# Patient Record
Sex: FEMALE | Race: WHITE | NOT HISPANIC OR LATINO | Employment: OTHER | URBAN - METROPOLITAN AREA
[De-identification: names, ages, dates, MRNs, and addresses within clinical notes are randomized per-mention and may not be internally consistent; named-entity substitution may affect disease eponyms.]

---

## 2023-06-18 ENCOUNTER — OFFICE VISIT (OUTPATIENT)
Dept: URGENT CARE | Facility: URGENT CARE | Age: 73
End: 2023-06-18
Payer: MEDICARE

## 2023-06-18 VITALS
RESPIRATION RATE: 20 BRPM | TEMPERATURE: 98.2 F | SYSTOLIC BLOOD PRESSURE: 143 MMHG | WEIGHT: 265 LBS | HEART RATE: 101 BPM | OXYGEN SATURATION: 98 % | DIASTOLIC BLOOD PRESSURE: 80 MMHG

## 2023-06-18 DIAGNOSIS — H57.89 REDNESS AND DISCHARGE OF EYE: Primary | ICD-10-CM

## 2023-06-18 PROBLEM — R91.1 PULMONARY NODULE: Status: ACTIVE | Noted: 2021-04-13

## 2023-06-18 PROBLEM — E66.9 OBESITY: Status: ACTIVE | Noted: 2019-10-21

## 2023-06-18 PROBLEM — M17.12 OSTEOARTHRITIS OF LEFT KNEE: Status: ACTIVE | Noted: 2018-06-18

## 2023-06-18 PROBLEM — Z91.09 POLLEN ALLERGIES: Status: ACTIVE | Noted: 2017-10-11

## 2023-06-18 PROBLEM — M81.0 AGE-RELATED OSTEOPOROSIS WITHOUT CURRENT PATHOLOGICAL FRACTURE: Status: ACTIVE | Noted: 2021-10-21

## 2023-06-18 PROBLEM — F51.04 PSYCHOPHYSIOLOGICAL INSOMNIA: Status: ACTIVE | Noted: 2019-05-05

## 2023-06-18 PROBLEM — E78.5 HYPERLIPIDEMIA: Status: ACTIVE | Noted: 2021-06-17

## 2023-06-18 PROBLEM — G25.81 RESTLESS LEGS: Status: ACTIVE | Noted: 2018-11-29

## 2023-06-18 PROBLEM — F41.9 ANXIETY: Status: ACTIVE | Noted: 2019-05-05

## 2023-06-18 PROBLEM — K85.00 IDIOPATHIC ACUTE PANCREATITIS WITHOUT INFECTION OR NECROSIS: Status: ACTIVE | Noted: 2019-04-30

## 2023-06-18 PROBLEM — E03.9 HYPOTHYROIDISM: Status: ACTIVE | Noted: 2021-10-21

## 2023-06-18 PROBLEM — I10 ESSENTIAL HYPERTENSION: Status: ACTIVE | Noted: 2019-05-05

## 2023-06-18 PROCEDURE — 99203 OFFICE O/P NEW LOW 30 MIN: CPT | Performed by: NURSE PRACTITIONER

## 2023-06-18 RX ORDER — ALENDRONATE SODIUM 70 MG/1
70 TABLET ORAL
COMMUNITY
Start: 2023-05-23

## 2023-06-18 RX ORDER — MAGNESIUM OXIDE 400 MG/1
400 TABLET ORAL DAILY
Status: ON HOLD | COMMUNITY
End: 2024-09-06

## 2023-06-18 RX ORDER — SPIRONOLACTONE 25 MG/1
1 TABLET ORAL DAILY
Status: ON HOLD | COMMUNITY
Start: 2022-09-09 | End: 2024-09-06

## 2023-06-18 RX ORDER — EZETIMIBE 10 MG/1
10 TABLET ORAL DAILY
COMMUNITY
Start: 2023-04-30

## 2023-06-18 RX ORDER — LEVOTHYROXINE SODIUM 150 UG/1
150 TABLET ORAL DAILY
COMMUNITY
Start: 2023-04-30

## 2023-06-18 RX ORDER — FUROSEMIDE 20 MG
20 TABLET ORAL DAILY
COMMUNITY
Start: 2023-06-13

## 2023-06-18 RX ORDER — GINSENG 100 MG
50 CAPSULE ORAL DAILY
Status: ON HOLD | COMMUNITY
Start: 2021-10-01 | End: 2024-09-06

## 2023-06-18 RX ORDER — LISINOPRIL 5 MG/1
5 TABLET ORAL DAILY
COMMUNITY
Start: 2023-04-30

## 2023-06-18 RX ORDER — DIPHENOXYLATE HYDROCHLORIDE AND ATROPINE SULFATE 2.5; .025 MG/1; MG/1
1 TABLET ORAL DAILY
Status: ON HOLD | COMMUNITY
End: 2024-09-06

## 2023-06-18 NOTE — PATIENT INSTRUCTIONS
Follow up with ophthalmology tomorrow    Go to the Emergency Room if vision changes or pain increases immediately

## 2023-06-18 NOTE — NURSING NOTE
VISION   Wears glasses: worn for testing  Tool used: Grady   Right eye:        10/25 (20/50)  Left eye:          10/10 (20/20)    JAQUELINE Cruz, [facility] June 18, 2023 10:53 AM

## 2023-06-18 NOTE — PROGRESS NOTES
Chief Complaint   Patient presents with     Urgent Care     Right eye infection          ICD-10-CM    1. Redness and discharge of eye  H57.89 Adult Eye  Referral      Referral made for patient to be seen tomorrow by ophthalmology.  She is instructed to go to the emergency room if she starts to have any changes in her vision or pain in the eye itself.    Differential diagnosis includes but is not limited to: Corneal abrasion, lens injury, scleral abrasion, hyphema, globe perforation, traumatic iritis, retinal detachment, vitreous hemorrhage, eye wrist trauma, orbit fracture, uveitis, conjunctivitis, pterygium, foreign body, acute glaucoma.    Subjective     Lynda Knight is an 73 year old female who presents to clinic today for swelling around the right eye since 4 days ago,watery drainage.  She denies any trauma or injury.  She has had no changes in her vision.      ROS: 10 point ROS neg other than the symptoms noted above in the HPI.       Objective    BP (!) 143/80   Pulse 101   Temp 98.2  F (36.8  C)   Resp 20   Wt 120.2 kg (265 lb)   SpO2 98%   Nurses notes and VS have been reviewed.    Visual acuity was within normal limits bilaterally today.    Physical Exam       GENERAL APPEARANCE: healthy appearing, alert     EYES: Pupils are equal reactive to light, upper lid is slightly swollen, under the eye there is mild edema but no erythema, conjunctiva are severely injected on the right, entire sclera appears dark red and edematous     HENT: oral exam benign, mucus membranes intact, without ulcers or lesions     NECK: no adenopathy or asymmetry, thyroid normal to palpation     SKIN: no suspicious lesions or rashes     NEURO: Normal strength and tone, mentation intact and speech normal      ADORE Tracy, CNP  [facility] Provider    The use of Dragon/[employer] dictation services may have been used to construct the content in this note; any grammatical or spelling errors are  non-intentional. Please contact the author of this note directly if you are in need of any clarification.

## 2024-08-26 ENCOUNTER — TRANSFERRED RECORDS (OUTPATIENT)
Dept: MULTI SPECIALTY CLINIC | Facility: CLINIC | Age: 74
End: 2024-08-26
Payer: MEDICARE

## 2024-08-26 LAB
CREATININE (EXTERNAL): 0.74 MG/DL (ref 0.55–1.02)
GFR ESTIMATED (EXTERNAL): >60 ML/MIN/1.73M2
HBA1C MFR BLD: 5.8 %
POTASSIUM (EXTERNAL): 4.2 MMOL/L (ref 3.5–5.1)

## 2024-09-03 RX ORDER — OMEGA-3 FATTY ACIDS/FISH OIL 300-1000MG
200 CAPSULE ORAL EVERY 6 HOURS PRN
Status: ON HOLD | COMMUNITY
End: 2024-09-06

## 2024-09-05 ENCOUNTER — ANESTHESIA EVENT (OUTPATIENT)
Dept: SURGERY | Facility: CLINIC | Age: 74
End: 2024-09-05
Payer: MEDICARE

## 2024-09-06 ENCOUNTER — ANESTHESIA (OUTPATIENT)
Dept: SURGERY | Facility: CLINIC | Age: 74
End: 2024-09-06
Payer: MEDICARE

## 2024-09-06 ENCOUNTER — HOSPITAL ENCOUNTER (OUTPATIENT)
Facility: CLINIC | Age: 74
Setting detail: OBSERVATION
Discharge: HOME | End: 2024-09-07
Attending: ORTHOPAEDIC SURGERY | Admitting: ORTHOPAEDIC SURGERY
Payer: MEDICARE

## 2024-09-06 ENCOUNTER — APPOINTMENT (OUTPATIENT)
Dept: RADIOLOGY | Facility: CLINIC | Age: 74
End: 2024-09-06
Attending: ORTHOPAEDIC SURGERY
Payer: MEDICARE

## 2024-09-06 ENCOUNTER — ANCILLARY PROCEDURE (OUTPATIENT)
Dept: ULTRASOUND IMAGING | Facility: CLINIC | Age: 74
End: 2024-09-06
Attending: ANESTHESIOLOGY
Payer: MEDICARE

## 2024-09-06 DIAGNOSIS — Z98.1 S/P ANKLE FUSION: Primary | ICD-10-CM

## 2024-09-06 PROCEDURE — 250N000011 HC RX IP 250 OP 636: Performed by: ANESTHESIOLOGY

## 2024-09-06 PROCEDURE — 258N000003 HC RX IP 258 OP 636: Performed by: ANESTHESIOLOGY

## 2024-09-06 PROCEDURE — 999N000141 HC STATISTIC PRE-PROCEDURE NURSING ASSESSMENT: Performed by: ORTHOPAEDIC SURGERY

## 2024-09-06 PROCEDURE — 710N000010 HC RECOVERY PHASE 1, LEVEL 2, PER MIN: Performed by: ORTHOPAEDIC SURGERY

## 2024-09-06 PROCEDURE — 258N000003 HC RX IP 258 OP 636: Performed by: NURSE ANESTHETIST, CERTIFIED REGISTERED

## 2024-09-06 PROCEDURE — 272N000001 HC OR GENERAL SUPPLY STERILE: Performed by: ORTHOPAEDIC SURGERY

## 2024-09-06 PROCEDURE — 250N000025 HC SEVOFLURANE, PER MIN: Performed by: ORTHOPAEDIC SURGERY

## 2024-09-06 PROCEDURE — 250N000011 HC RX IP 250 OP 636: Performed by: PHYSICIAN ASSISTANT

## 2024-09-06 PROCEDURE — 250N000011 HC RX IP 250 OP 636: Performed by: NURSE ANESTHETIST, CERTIFIED REGISTERED

## 2024-09-06 PROCEDURE — 250N000013 HC RX MED GY IP 250 OP 250 PS 637: Performed by: ANESTHESIOLOGY

## 2024-09-06 PROCEDURE — 258N000003 HC RX IP 258 OP 636: Performed by: PHYSICIAN ASSISTANT

## 2024-09-06 PROCEDURE — 370N000017 HC ANESTHESIA TECHNICAL FEE, PER MIN: Performed by: ORTHOPAEDIC SURGERY

## 2024-09-06 PROCEDURE — C1713 ANCHOR/SCREW BN/BN,TIS/BN: HCPCS | Performed by: ORTHOPAEDIC SURGERY

## 2024-09-06 PROCEDURE — 250N000013 HC RX MED GY IP 250 OP 250 PS 637: Performed by: PHYSICIAN ASSISTANT

## 2024-09-06 PROCEDURE — 360N000083 HC SURGERY LEVEL 3 W/ FLUORO, PER MIN: Performed by: ORTHOPAEDIC SURGERY

## 2024-09-06 PROCEDURE — 250N000009 HC RX 250: Performed by: NURSE ANESTHETIST, CERTIFIED REGISTERED

## 2024-09-06 PROCEDURE — 710N000012 HC RECOVERY PHASE 2, PER MINUTE: Performed by: ORTHOPAEDIC SURGERY

## 2024-09-06 PROCEDURE — 999N000180 XR SURGERY CARM FLUORO LESS THAN 5 MIN: Mod: TC

## 2024-09-06 PROCEDURE — C1734 ORTH/DEVIC/DRUG BN/BN,TIS/BN: HCPCS | Performed by: ORTHOPAEDIC SURGERY

## 2024-09-06 DEVICE — IMPLANTABLE DEVICE: Type: IMPLANTABLE DEVICE | Site: ANKLE | Status: FUNCTIONAL

## 2024-09-06 DEVICE — GRAFT BONE SUBSTITUTE BIOCOMP AUGMENT INJ 3ML K300-030-10: Type: IMPLANTABLE DEVICE | Site: ANKLE | Status: FUNCTIONAL

## 2024-09-06 RX ORDER — BUPIVACAINE HYDROCHLORIDE 5 MG/ML
INJECTION, SOLUTION EPIDURAL; INTRACAUDAL
Status: COMPLETED | OUTPATIENT
Start: 2024-09-06 | End: 2024-09-06

## 2024-09-06 RX ORDER — HYDROXYZINE HYDROCHLORIDE 10 MG/1
10 TABLET, FILM COATED ORAL EVERY 6 HOURS PRN
Status: DISCONTINUED | OUTPATIENT
Start: 2024-09-06 | End: 2024-09-07 | Stop reason: HOSPADM

## 2024-09-06 RX ORDER — LEVOTHYROXINE SODIUM 75 UG/1
150 TABLET ORAL DAILY
Status: DISCONTINUED | OUTPATIENT
Start: 2024-09-07 | End: 2024-09-07 | Stop reason: HOSPADM

## 2024-09-06 RX ORDER — NALOXONE HYDROCHLORIDE 0.4 MG/ML
0.1 INJECTION, SOLUTION INTRAMUSCULAR; INTRAVENOUS; SUBCUTANEOUS
Status: DISCONTINUED | OUTPATIENT
Start: 2024-09-06 | End: 2024-09-06 | Stop reason: HOSPADM

## 2024-09-06 RX ORDER — FUROSEMIDE 20 MG
20 TABLET ORAL DAILY
Status: DISCONTINUED | OUTPATIENT
Start: 2024-09-06 | End: 2024-09-07 | Stop reason: HOSPADM

## 2024-09-06 RX ORDER — CELECOXIB 200 MG/1
200 CAPSULE ORAL DAILY
Qty: 14 CAPSULE | Refills: 0 | Status: SHIPPED | OUTPATIENT
Start: 2024-09-06 | End: 2024-09-20

## 2024-09-06 RX ORDER — LISINOPRIL 5 MG/1
5 TABLET ORAL DAILY
Status: DISCONTINUED | OUTPATIENT
Start: 2024-09-07 | End: 2024-09-07 | Stop reason: HOSPADM

## 2024-09-06 RX ORDER — FENTANYL CITRATE 50 UG/ML
25 INJECTION, SOLUTION INTRAMUSCULAR; INTRAVENOUS
Status: DISCONTINUED | OUTPATIENT
Start: 2024-09-06 | End: 2024-09-06 | Stop reason: HOSPADM

## 2024-09-06 RX ORDER — HYDROMORPHONE HCL IN WATER/PF 6 MG/30 ML
0.2 PATIENT CONTROLLED ANALGESIA SYRINGE INTRAVENOUS EVERY 5 MIN PRN
Status: DISCONTINUED | OUTPATIENT
Start: 2024-09-06 | End: 2024-09-06 | Stop reason: HOSPADM

## 2024-09-06 RX ORDER — BISACODYL 10 MG
10 SUPPOSITORY, RECTAL RECTAL DAILY PRN
Status: DISCONTINUED | OUTPATIENT
Start: 2024-09-09 | End: 2024-09-07 | Stop reason: HOSPADM

## 2024-09-06 RX ORDER — FENTANYL CITRATE 50 UG/ML
INJECTION, SOLUTION INTRAMUSCULAR; INTRAVENOUS PRN
Status: DISCONTINUED | OUTPATIENT
Start: 2024-09-06 | End: 2024-09-06

## 2024-09-06 RX ORDER — ACETAMINOPHEN 325 MG/1
650 TABLET ORAL EVERY 6 HOURS
Qty: 100 TABLET | Refills: 0 | Status: SHIPPED | OUTPATIENT
Start: 2024-09-06

## 2024-09-06 RX ORDER — ONDANSETRON 2 MG/ML
INJECTION INTRAMUSCULAR; INTRAVENOUS PRN
Status: DISCONTINUED | OUTPATIENT
Start: 2024-09-06 | End: 2024-09-06

## 2024-09-06 RX ORDER — SODIUM CHLORIDE, SODIUM LACTATE, POTASSIUM CHLORIDE, CALCIUM CHLORIDE 600; 310; 30; 20 MG/100ML; MG/100ML; MG/100ML; MG/100ML
INJECTION, SOLUTION INTRAVENOUS CONTINUOUS
Status: DISCONTINUED | OUTPATIENT
Start: 2024-09-06 | End: 2024-09-06 | Stop reason: HOSPADM

## 2024-09-06 RX ORDER — AMOXICILLIN 250 MG
1 CAPSULE ORAL 2 TIMES DAILY
Status: DISCONTINUED | OUTPATIENT
Start: 2024-09-06 | End: 2024-09-07 | Stop reason: HOSPADM

## 2024-09-06 RX ORDER — NALOXONE HYDROCHLORIDE 0.4 MG/ML
0.4 INJECTION, SOLUTION INTRAMUSCULAR; INTRAVENOUS; SUBCUTANEOUS
Status: DISCONTINUED | OUTPATIENT
Start: 2024-09-06 | End: 2024-09-07 | Stop reason: HOSPADM

## 2024-09-06 RX ORDER — PROPOFOL 10 MG/ML
INJECTION, EMULSION INTRAVENOUS PRN
Status: DISCONTINUED | OUTPATIENT
Start: 2024-09-06 | End: 2024-09-06

## 2024-09-06 RX ORDER — PROCHLORPERAZINE MALEATE 5 MG
5 TABLET ORAL EVERY 6 HOURS PRN
Status: DISCONTINUED | OUTPATIENT
Start: 2024-09-06 | End: 2024-09-07 | Stop reason: HOSPADM

## 2024-09-06 RX ORDER — PROPOFOL 10 MG/ML
INJECTION, EMULSION INTRAVENOUS
Status: DISCONTINUED
Start: 2024-09-06 | End: 2024-09-06 | Stop reason: HOSPADM

## 2024-09-06 RX ORDER — DEXAMETHASONE SODIUM PHOSPHATE 4 MG/ML
4 INJECTION, SOLUTION INTRA-ARTICULAR; INTRALESIONAL; INTRAMUSCULAR; INTRAVENOUS; SOFT TISSUE
Status: DISCONTINUED | OUTPATIENT
Start: 2024-09-06 | End: 2024-09-06 | Stop reason: HOSPADM

## 2024-09-06 RX ORDER — OXYCODONE HYDROCHLORIDE 5 MG/1
10 TABLET ORAL EVERY 4 HOURS PRN
Status: DISCONTINUED | OUTPATIENT
Start: 2024-09-06 | End: 2024-09-07 | Stop reason: HOSPADM

## 2024-09-06 RX ORDER — ACETAMINOPHEN 325 MG/1
975 TABLET ORAL ONCE
Status: COMPLETED | OUTPATIENT
Start: 2024-09-06 | End: 2024-09-06

## 2024-09-06 RX ORDER — EZETIMIBE 10 MG/1
10 TABLET ORAL DAILY
Status: DISCONTINUED | OUTPATIENT
Start: 2024-09-06 | End: 2024-09-07 | Stop reason: HOSPADM

## 2024-09-06 RX ORDER — OXYCODONE HYDROCHLORIDE 5 MG/1
5 TABLET ORAL EVERY 4 HOURS PRN
Status: DISCONTINUED | OUTPATIENT
Start: 2024-09-06 | End: 2024-09-07 | Stop reason: HOSPADM

## 2024-09-06 RX ORDER — POLYETHYLENE GLYCOL 3350 17 G/17G
17 POWDER, FOR SOLUTION ORAL DAILY
Status: DISCONTINUED | OUTPATIENT
Start: 2024-09-07 | End: 2024-09-07 | Stop reason: HOSPADM

## 2024-09-06 RX ORDER — FENTANYL CITRATE 50 UG/ML
25-100 INJECTION, SOLUTION INTRAMUSCULAR; INTRAVENOUS
Status: DISCONTINUED | OUTPATIENT
Start: 2024-09-06 | End: 2024-09-06 | Stop reason: HOSPADM

## 2024-09-06 RX ORDER — ONDANSETRON 2 MG/ML
4 INJECTION INTRAMUSCULAR; INTRAVENOUS EVERY 6 HOURS PRN
Status: DISCONTINUED | OUTPATIENT
Start: 2024-09-06 | End: 2024-09-07 | Stop reason: HOSPADM

## 2024-09-06 RX ORDER — OXYCODONE HYDROCHLORIDE 5 MG/1
5-10 TABLET ORAL EVERY 4 HOURS PRN
Qty: 12 TABLET | Refills: 0 | Status: SHIPPED | OUTPATIENT
Start: 2024-09-06

## 2024-09-06 RX ORDER — POLYETHYLENE GLYCOL 3350 17 G/17G
1 POWDER, FOR SOLUTION ORAL DAILY
Qty: 7 PACKET | Refills: 0 | Status: SHIPPED | OUTPATIENT
Start: 2024-09-06

## 2024-09-06 RX ORDER — ASPIRIN 325 MG
325 TABLET, DELAYED RELEASE (ENTERIC COATED) ORAL DAILY
Status: DISCONTINUED | OUTPATIENT
Start: 2024-09-06 | End: 2024-09-07 | Stop reason: HOSPADM

## 2024-09-06 RX ORDER — SODIUM CHLORIDE, SODIUM LACTATE, POTASSIUM CHLORIDE, CALCIUM CHLORIDE 600; 310; 30; 20 MG/100ML; MG/100ML; MG/100ML; MG/100ML
INJECTION, SOLUTION INTRAVENOUS CONTINUOUS
Status: DISCONTINUED | OUTPATIENT
Start: 2024-09-06 | End: 2024-09-07 | Stop reason: HOSPADM

## 2024-09-06 RX ORDER — CELECOXIB 100 MG/1
100 CAPSULE ORAL 2 TIMES DAILY
Status: DISCONTINUED | OUTPATIENT
Start: 2024-09-07 | End: 2024-09-07 | Stop reason: HOSPADM

## 2024-09-06 RX ORDER — HYDROXYZINE HYDROCHLORIDE 10 MG/1
10 TABLET, FILM COATED ORAL EVERY 6 HOURS PRN
Qty: 30 TABLET | Refills: 0 | Status: SHIPPED | OUTPATIENT
Start: 2024-09-06

## 2024-09-06 RX ORDER — NALOXONE HYDROCHLORIDE 0.4 MG/ML
0.2 INJECTION, SOLUTION INTRAMUSCULAR; INTRAVENOUS; SUBCUTANEOUS
Status: DISCONTINUED | OUTPATIENT
Start: 2024-09-06 | End: 2024-09-07 | Stop reason: HOSPADM

## 2024-09-06 RX ORDER — HYDROMORPHONE HCL IN WATER/PF 6 MG/30 ML
0.4 PATIENT CONTROLLED ANALGESIA SYRINGE INTRAVENOUS
Status: DISCONTINUED | OUTPATIENT
Start: 2024-09-06 | End: 2024-09-07 | Stop reason: HOSPADM

## 2024-09-06 RX ORDER — SPIRONOLACTONE 25 MG/1
25 TABLET ORAL DAILY
COMMUNITY

## 2024-09-06 RX ORDER — CEFAZOLIN SODIUM 2 G/100ML
2 INJECTION, SOLUTION INTRAVENOUS EVERY 8 HOURS
Status: COMPLETED | OUTPATIENT
Start: 2024-09-06 | End: 2024-09-07

## 2024-09-06 RX ORDER — LIDOCAINE HYDROCHLORIDE 10 MG/ML
INJECTION, SOLUTION INFILTRATION; PERINEURAL PRN
Status: DISCONTINUED | OUTPATIENT
Start: 2024-09-06 | End: 2024-09-06

## 2024-09-06 RX ORDER — ASPIRIN 325 MG
325 TABLET, DELAYED RELEASE (ENTERIC COATED) ORAL DAILY
Qty: 42 TABLET | Refills: 0 | Status: SHIPPED | OUTPATIENT
Start: 2024-09-06

## 2024-09-06 RX ORDER — DEXAMETHASONE SODIUM PHOSPHATE 10 MG/ML
INJECTION, SOLUTION INTRAMUSCULAR; INTRAVENOUS PRN
Status: DISCONTINUED | OUTPATIENT
Start: 2024-09-06 | End: 2024-09-06

## 2024-09-06 RX ORDER — HYDROMORPHONE HCL IN WATER/PF 6 MG/30 ML
0.4 PATIENT CONTROLLED ANALGESIA SYRINGE INTRAVENOUS EVERY 5 MIN PRN
Status: DISCONTINUED | OUTPATIENT
Start: 2024-09-06 | End: 2024-09-06 | Stop reason: HOSPADM

## 2024-09-06 RX ORDER — FENTANYL CITRATE 50 UG/ML
50 INJECTION, SOLUTION INTRAMUSCULAR; INTRAVENOUS EVERY 5 MIN PRN
Status: DISCONTINUED | OUTPATIENT
Start: 2024-09-06 | End: 2024-09-06 | Stop reason: HOSPADM

## 2024-09-06 RX ORDER — ACETAMINOPHEN 325 MG/1
650 TABLET ORAL EVERY 6 HOURS
Status: DISCONTINUED | OUTPATIENT
Start: 2024-09-06 | End: 2024-09-07 | Stop reason: HOSPADM

## 2024-09-06 RX ORDER — LIDOCAINE 40 MG/G
CREAM TOPICAL
Status: DISCONTINUED | OUTPATIENT
Start: 2024-09-06 | End: 2024-09-07 | Stop reason: HOSPADM

## 2024-09-06 RX ORDER — AMOXICILLIN 250 MG
1-2 CAPSULE ORAL 2 TIMES DAILY
Qty: 30 TABLET | Refills: 0 | Status: SHIPPED | OUTPATIENT
Start: 2024-09-06

## 2024-09-06 RX ORDER — MEPERIDINE HYDROCHLORIDE 25 MG/ML
12.5 INJECTION INTRAMUSCULAR; INTRAVENOUS; SUBCUTANEOUS EVERY 5 MIN PRN
Status: DISCONTINUED | OUTPATIENT
Start: 2024-09-06 | End: 2024-09-06 | Stop reason: HOSPADM

## 2024-09-06 RX ORDER — OXYCODONE HYDROCHLORIDE 5 MG/1
5 TABLET ORAL
Status: COMPLETED | OUTPATIENT
Start: 2024-09-06 | End: 2024-09-06

## 2024-09-06 RX ORDER — FENTANYL CITRATE 50 UG/ML
25 INJECTION, SOLUTION INTRAMUSCULAR; INTRAVENOUS EVERY 5 MIN PRN
Status: DISCONTINUED | OUTPATIENT
Start: 2024-09-06 | End: 2024-09-06 | Stop reason: HOSPADM

## 2024-09-06 RX ORDER — ONDANSETRON 2 MG/ML
4 INJECTION INTRAMUSCULAR; INTRAVENOUS EVERY 30 MIN PRN
Status: DISCONTINUED | OUTPATIENT
Start: 2024-09-06 | End: 2024-09-06 | Stop reason: HOSPADM

## 2024-09-06 RX ORDER — CELECOXIB 100 MG/1
100 CAPSULE ORAL 2 TIMES DAILY
Status: DISCONTINUED | OUTPATIENT
Start: 2024-09-06 | End: 2024-09-06

## 2024-09-06 RX ORDER — LIDOCAINE 40 MG/G
CREAM TOPICAL
Status: DISCONTINUED | OUTPATIENT
Start: 2024-09-06 | End: 2024-09-06 | Stop reason: HOSPADM

## 2024-09-06 RX ORDER — HALOPERIDOL 5 MG/ML
1 INJECTION INTRAMUSCULAR
Status: DISCONTINUED | OUTPATIENT
Start: 2024-09-06 | End: 2024-09-06 | Stop reason: HOSPADM

## 2024-09-06 RX ORDER — PROPOFOL 10 MG/ML
INJECTION, EMULSION INTRAVENOUS CONTINUOUS PRN
Status: DISCONTINUED | OUTPATIENT
Start: 2024-09-06 | End: 2024-09-06

## 2024-09-06 RX ORDER — ONDANSETRON 4 MG/1
4 TABLET, ORALLY DISINTEGRATING ORAL EVERY 30 MIN PRN
Status: DISCONTINUED | OUTPATIENT
Start: 2024-09-06 | End: 2024-09-06 | Stop reason: HOSPADM

## 2024-09-06 RX ORDER — DEXAMETHASONE SODIUM PHOSPHATE 10 MG/ML
4 INJECTION, SOLUTION INTRAMUSCULAR; INTRAVENOUS
Status: DISCONTINUED | OUTPATIENT
Start: 2024-09-06 | End: 2024-09-06 | Stop reason: HOSPADM

## 2024-09-06 RX ORDER — EPHEDRINE SULFATE 50 MG/ML
INJECTION, SOLUTION INTRAMUSCULAR; INTRAVENOUS; SUBCUTANEOUS PRN
Status: DISCONTINUED | OUTPATIENT
Start: 2024-09-06 | End: 2024-09-06

## 2024-09-06 RX ORDER — ONDANSETRON 4 MG/1
4 TABLET, ORALLY DISINTEGRATING ORAL EVERY 6 HOURS PRN
Status: DISCONTINUED | OUTPATIENT
Start: 2024-09-06 | End: 2024-09-07 | Stop reason: HOSPADM

## 2024-09-06 RX ORDER — SPIRONOLACTONE 25 MG/1
25 TABLET ORAL DAILY
Status: DISCONTINUED | OUTPATIENT
Start: 2024-09-06 | End: 2024-09-07 | Stop reason: HOSPADM

## 2024-09-06 RX ORDER — OXYCODONE HYDROCHLORIDE 5 MG/1
10 TABLET ORAL
Status: DISCONTINUED | OUTPATIENT
Start: 2024-09-06 | End: 2024-09-06 | Stop reason: HOSPADM

## 2024-09-06 RX ORDER — CEFAZOLIN SODIUM/WATER 2 G/20 ML
2 SYRINGE (ML) INTRAVENOUS SEE ADMIN INSTRUCTIONS
Status: DISCONTINUED | OUTPATIENT
Start: 2024-09-06 | End: 2024-09-06 | Stop reason: HOSPADM

## 2024-09-06 RX ORDER — HYDROMORPHONE HCL IN WATER/PF 6 MG/30 ML
0.2 PATIENT CONTROLLED ANALGESIA SYRINGE INTRAVENOUS
Status: DISCONTINUED | OUTPATIENT
Start: 2024-09-06 | End: 2024-09-07 | Stop reason: HOSPADM

## 2024-09-06 RX ORDER — CELECOXIB 100 MG/1
100 CAPSULE ORAL 2 TIMES DAILY
Status: DISCONTINUED | OUTPATIENT
Start: 2024-09-07 | End: 2024-09-06

## 2024-09-06 RX ORDER — CEFAZOLIN SODIUM/WATER 2 G/20 ML
2 SYRINGE (ML) INTRAVENOUS
Status: COMPLETED | OUTPATIENT
Start: 2024-09-06 | End: 2024-09-06

## 2024-09-06 RX ADMIN — MIDAZOLAM HYDROCHLORIDE 1 MG: 1 INJECTION, SOLUTION INTRAMUSCULAR; INTRAVENOUS at 06:48

## 2024-09-06 RX ADMIN — PHENYLEPHRINE HYDROCHLORIDE 100 MCG: 10 INJECTION INTRAVENOUS at 07:49

## 2024-09-06 RX ADMIN — DEXAMETHASONE SODIUM PHOSPHATE 10 MG: 10 INJECTION, SOLUTION INTRAMUSCULAR; INTRAVENOUS at 07:24

## 2024-09-06 RX ADMIN — HYDROMORPHONE HYDROCHLORIDE 0.4 MG: 0.2 INJECTION, SOLUTION INTRAMUSCULAR; INTRAVENOUS; SUBCUTANEOUS at 09:01

## 2024-09-06 RX ADMIN — MIDAZOLAM HYDROCHLORIDE 1 MG: 1 INJECTION, SOLUTION INTRAMUSCULAR; INTRAVENOUS at 06:51

## 2024-09-06 RX ADMIN — SODIUM CHLORIDE, POTASSIUM CHLORIDE, SODIUM LACTATE AND CALCIUM CHLORIDE: 600; 310; 30; 20 INJECTION, SOLUTION INTRAVENOUS at 06:17

## 2024-09-06 RX ADMIN — ONDANSETRON 4 MG: 2 INJECTION INTRAMUSCULAR; INTRAVENOUS at 08:08

## 2024-09-06 RX ADMIN — FENTANYL CITRATE 100 MCG: 50 INJECTION INTRAMUSCULAR; INTRAVENOUS at 07:24

## 2024-09-06 RX ADMIN — PHENYLEPHRINE HYDROCHLORIDE 100 MCG: 10 INJECTION INTRAVENOUS at 07:52

## 2024-09-06 RX ADMIN — HYDROMORPHONE HYDROCHLORIDE 0.4 MG: 0.2 INJECTION, SOLUTION INTRAMUSCULAR; INTRAVENOUS; SUBCUTANEOUS at 09:11

## 2024-09-06 RX ADMIN — OXYCODONE HYDROCHLORIDE 5 MG: 5 TABLET ORAL at 09:38

## 2024-09-06 RX ADMIN — CELECOXIB 100 MG: 100 CAPSULE ORAL at 17:36

## 2024-09-06 RX ADMIN — FENTANYL CITRATE 50 MCG: 50 INJECTION, SOLUTION INTRAMUSCULAR; INTRAVENOUS at 06:48

## 2024-09-06 RX ADMIN — EZETIMIBE 10 MG: 10 TABLET ORAL at 17:37

## 2024-09-06 RX ADMIN — ASPIRIN 325 MG: 325 TABLET ORAL at 17:39

## 2024-09-06 RX ADMIN — BUPIVACAINE HYDROCHLORIDE 30 ML: 5 INJECTION, SOLUTION EPIDURAL; INTRACAUDAL at 06:48

## 2024-09-06 RX ADMIN — LIDOCAINE HYDROCHLORIDE 50 MG: 10 INJECTION, SOLUTION INFILTRATION; PERINEURAL at 07:24

## 2024-09-06 RX ADMIN — ACETAMINOPHEN 650 MG: 325 TABLET ORAL at 17:35

## 2024-09-06 RX ADMIN — Medication 5 MG: at 08:02

## 2024-09-06 RX ADMIN — SODIUM CHLORIDE, POTASSIUM CHLORIDE, SODIUM LACTATE AND CALCIUM CHLORIDE: 600; 310; 30; 20 INJECTION, SOLUTION INTRAVENOUS at 17:40

## 2024-09-06 RX ADMIN — PHENYLEPHRINE HYDROCHLORIDE 200 MCG: 10 INJECTION INTRAVENOUS at 07:33

## 2024-09-06 RX ADMIN — OXYCODONE HYDROCHLORIDE 5 MG: 5 TABLET ORAL at 22:21

## 2024-09-06 RX ADMIN — BUPIVACAINE HYDROCHLORIDE 15 ML: 5 INJECTION, SOLUTION EPIDURAL; INTRACAUDAL at 06:54

## 2024-09-06 RX ADMIN — ACETAMINOPHEN 975 MG: 325 TABLET ORAL at 06:21

## 2024-09-06 RX ADMIN — FENTANYL CITRATE 50 MCG: 50 INJECTION, SOLUTION INTRAMUSCULAR; INTRAVENOUS at 08:55

## 2024-09-06 RX ADMIN — ACETAMINOPHEN 650 MG: 325 TABLET ORAL at 22:08

## 2024-09-06 RX ADMIN — PHENYLEPHRINE HYDROCHLORIDE 200 MCG: 10 INJECTION INTRAVENOUS at 07:58

## 2024-09-06 RX ADMIN — SENNOSIDES AND DOCUSATE SODIUM 1 TABLET: 50; 8.6 TABLET ORAL at 22:08

## 2024-09-06 RX ADMIN — PROPOFOL 100 MCG/KG/MIN: 10 INJECTION, EMULSION INTRAVENOUS at 07:28

## 2024-09-06 RX ADMIN — PHENYLEPHRINE HYDROCHLORIDE 200 MCG: 10 INJECTION INTRAVENOUS at 07:39

## 2024-09-06 RX ADMIN — FENTANYL CITRATE 50 MCG: 50 INJECTION, SOLUTION INTRAMUSCULAR; INTRAVENOUS at 06:51

## 2024-09-06 RX ADMIN — CEFAZOLIN SODIUM 2 G: 2 INJECTION, SOLUTION INTRAVENOUS at 17:34

## 2024-09-06 RX ADMIN — HYDROMORPHONE HYDROCHLORIDE 1 MG: 1 INJECTION, SOLUTION INTRAMUSCULAR; INTRAVENOUS; SUBCUTANEOUS at 08:42

## 2024-09-06 RX ADMIN — PROPOFOL 200 MG: 10 INJECTION, EMULSION INTRAVENOUS at 07:24

## 2024-09-06 RX ADMIN — Medication 2 G: at 07:13

## 2024-09-06 RX ADMIN — SODIUM CHLORIDE, POTASSIUM CHLORIDE, SODIUM LACTATE AND CALCIUM CHLORIDE: 600; 310; 30; 20 INJECTION, SOLUTION INTRAVENOUS at 08:26

## 2024-09-06 RX ADMIN — FENTANYL CITRATE 50 MCG: 50 INJECTION, SOLUTION INTRAMUSCULAR; INTRAVENOUS at 08:50

## 2024-09-06 ASSESSMENT — ACTIVITIES OF DAILY LIVING (ADL)
ADLS_ACUITY_SCORE: 33
ADLS_ACUITY_SCORE: 33
ADLS_ACUITY_SCORE: 28
ADLS_ACUITY_SCORE: 33
ADLS_ACUITY_SCORE: 28
ADLS_ACUITY_SCORE: 33
ADLS_ACUITY_SCORE: 33
ADLS_ACUITY_SCORE: 26
ADLS_ACUITY_SCORE: 33
ADLS_ACUITY_SCORE: 28
ADLS_ACUITY_SCORE: 26
ADLS_ACUITY_SCORE: 26

## 2024-09-06 NOTE — PHARMACY-ADMISSION MEDICATION HISTORY
Pharmacist Admission Medication History    Admission medication history is complete. The information provided in this note is only as accurate as the sources available at the time of the update.    Information Source(s): Patient and CareEverywhere/[employer] via in-person    Pertinent Information:      Changes made to PTA medication list:  Added: None  Deleted: None  Changed: None    Allergies reviewed with patient and updates made in EHR: yes    Medication History Completed By: Conner Zeng RPH 9/6/2024 6:50 AM    PTA Med List   Medication Sig Last Dose    alendronate (FOSAMAX) 70 MG tablet Take 70 mg by mouth every 7 days. 9/4/2024 at am    ezetimibe (ZETIA) 10 MG tablet Take 10 mg by mouth daily. 9/5/2024 at am    furosemide (LASIX) 20 MG tablet Take 20 mg by mouth daily. 9/5/2024 at am    ibuprofen (ADVIL/MOTRIN) 200 MG capsule Take 200 mg by mouth every 6 hours as needed for fever. 8/30/2024 at prn    levothyroxine (SYNTHROID/LEVOTHROID) 150 MCG tablet Take 150 mcg by mouth daily. 9/6/2024 at am    lisinopril (ZESTRIL) 5 MG tablet Take 5 mg by mouth daily. 9/6/2024 at am    spironolactone (ALDACTONE) 25 MG tablet Take 25 mg by mouth daily. 9/5/2024 at am

## 2024-09-06 NOTE — ANESTHESIA PROCEDURE NOTES
Adductor canal Procedure Note    Pre-Procedure   Staff -        Anesthesiologist:  Ehsan Loaiza MD       Performed By: anesthesiologist       Location: pre-op       Procedure Start/Stop Times: 9/6/2024 6:54 AM and 9/6/2024 6:55 AM       Pre-Anesthestic Checklist: patient identified, IV checked, site marked, risks and benefits discussed, informed consent, monitors and equipment checked, pre-op evaluation, at physician/surgeon's request and post-op pain management  Timeout:       Correct Patient: Yes        Correct Procedure: Yes        Correct Site: Yes        Correct Position: Yes        Correct Laterality: Yes        Site Marked: Yes  Procedure Documentation  Procedure: Adductor canal       Laterality: left       Patient Position: supine       Patient Prep/Sterile Barriers: sterile gloves, mask       Skin prep: Chloraprep       Needle Type: insulated       Needle Gauge: 20.        Needle Length (Inches): 4        Ultrasound guided       1. Ultrasound was used to identify targeted nerve, plexus, vascular marker, or fascial plane and place a needle adjacent to it in real-time.       2. Ultrasound was used to visualize the spread of anesthetic in close proximity to the above referenced structure.       3. A permanent image is entered into the patient's record.       4. The visualized anatomic structures appeared normal.       5. There were no apparent abnormal pathologic findings.    Assessment/Narrative         The placement was negative for: blood aspirated, painful injection and site bleeding       Paresthesias: No.       Bolus given via needle..        Secured via.        Insertion/Infusion Method: Single Shot       Complications: none       Injection made incrementally with aspirations every 5 mL.    Medication(s) Administered   Bupivacaine 0.5% PF (Infiltration) - Infiltration   15 mL - 9/6/2024 6:54:00 AM  Medication Administration Time: 9/6/2024 6:54 AM     Comments:  Negative aspiration every 5 ml of  "medication administered. No signs of LAST. No pain or paresthesias with block placement. No complications.         FOR [facility] ONLY:   Pain Team Contact information: please page the Pain Team Via [employer]. Search \"Pain\". During daytime hours, please page the attending first. At night please page the resident first.      "

## 2024-09-06 NOTE — ANESTHESIA POSTPROCEDURE EVALUATION
Patient: Lynda Knight    Procedure: Procedure(s):  LEFT OPEN ANKLE FUSION       Anesthesia Type:  General    Note:  Disposition: Admission   Postop Pain Control: Uneventful            Sign Out: Well controlled pain   PONV: No   Neuro/Psych: Uneventful            Sign Out: Acceptable/Baseline neuro status   Airway/Respiratory: Uneventful            Sign Out: O2 supplementation               Oxygen: Nasal Cannula   CV/Hemodynamics: Uneventful            Sign Out: Acceptable CV status; No obvious hypovolemia; No obvious fluid overload   Other NRE: NONE   DID A NON-ROUTINE EVENT OCCUR? No           Last vitals:  Vitals Value Taken Time   /59 09/06/24 0920   Temp 36.3  C (97.34  F) 09/06/24 0923   Pulse 76 09/06/24 0923   Resp 30 09/06/24 0923   SpO2 97 % 09/06/24 0923   Vitals shown include unfiled device data.    Electronically Signed By: Ehsan Loaiza MD  September 6, 2024  11:47 AM

## 2024-09-06 NOTE — INTERVAL H&P NOTE
I have reviewed the surgical (or preoperative) H&P that is linked to this encounter, and examined the patient. There are no significant changes    Clinical Conditions Present on Arrival:  Clinically Significant Risk Factors Present on Admission                      # Severe Obesity: Estimated body mass index is 48.61 kg/m  as calculated from the following:    Height as of this encounter: 1.524 m (5').    Weight as of this encounter: 112.9 kg (248 lb 14.4 oz).

## 2024-09-06 NOTE — ANESTHESIA PROCEDURE NOTES
Sciatic Procedure Note    Pre-Procedure   Staff -        Anesthesiologist:  Ehsan Loaiza MD       Performed By: anesthesiologist       Location: pre-op       Procedure Start/Stop Times: 9/6/2024 6:48 AM and 9/6/2024 6:53 AM       Pre-Anesthestic Checklist: patient identified, IV checked, site marked, risks and benefits discussed, informed consent, monitors and equipment checked, pre-op evaluation, at physician/surgeon's request and post-op pain management  Timeout:       Correct Patient: Yes        Correct Procedure: Yes        Correct Site: Yes        Correct Position: Yes        Correct Laterality: Yes        Site Marked: Yes  Procedure Documentation  Procedure: Sciatic       Laterality: left       Patient Position: supine       Patient Prep/Sterile Barriers: sterile gloves, mask       Skin prep: Chloraprep (popliteal approach).       Needle Type: insulated       Needle Gauge: 20.        Needle Length (Inches): 4        Ultrasound guided       1. Ultrasound was used to identify targeted nerve, plexus, vascular marker, or fascial plane and place a needle adjacent to it in real-time.       2. Ultrasound was used to visualize the spread of anesthetic in close proximity to the above referenced structure.       3. A permanent image is entered into the patient's record.       4. The visualized anatomic structures appeared normal.       5. There were no apparent abnormal pathologic findings.    Assessment/Narrative         The placement was negative for: blood aspirated, painful injection and site bleeding       Paresthesias: No.       Bolus given via needle..        Secured via.        Insertion/Infusion Method: Single Shot       Complications: none       Injection made incrementally with aspirations every 5 mL.    Medication(s) Administered   Bupivacaine 0.5% PF (Infiltration) - Infiltration   30 mL - 9/6/2024 6:48:00 AM  Medication Administration Time: 9/6/2024 6:48 AM     Comments:  Negative aspiration every 5  "ml of medication administered. No signs of LAST. No pain or paresthesias with block placement. No complications.         FOR [facility] ONLY:   Pain Team Contact information: please page the Pain Team Via [employer]. Search \"Pain\". During daytime hours, please page the attending first. At night please page the resident first.      "

## 2024-09-06 NOTE — ANESTHESIA PREPROCEDURE EVALUATION
"Anesthesia Pre-Procedure Evaluation    Patient: Lynda Knight   MRN: [id number] : 1950        Procedure : Procedure(s):  LEFT OPEN ANKLE FUSION          Past Medical History:   Diagnosis Date    Hypertension       History reviewed. No pertinent surgical history.   Allergies   Allergen Reactions    Statins      Other reaction(s): Muscle Aches/Weakness  Tried atorvastatin, pravastatin. Rosuvastatin was too expensive. Declines further statin trials.       Social History     Tobacco Use    Smoking status: Never    Smokeless tobacco: Never   Substance Use Topics    Alcohol use: Never      Wt Readings from Last 1 Encounters:   24 112.9 kg (248 lb 14.4 oz)        Anesthesia Evaluation   Pt has had prior anesthetic.         ROS/MED HX  ENT/Pulmonary:     (+)     SOPHIE risk factors,  hypertension, obese,                                Neurologic:    (-) no CVA   Cardiovascular:     (+) Dyslipidemia hypertension- -   -  - -                                      METS/Exercise Tolerance:     Hematologic:  - neg hematologic  ROS     Musculoskeletal:   (+)  arthritis,             GI/Hepatic:  - neg GI/hepatic ROS     Renal/Genitourinary:  - neg Renal ROS     Endo:     (+)          thyroid problem,     Obesity,       Psychiatric/Substance Use:     (+) psychiatric history anxiety       Infectious Disease:  - neg infectious disease ROS     Malignancy:  - neg malignancy ROS     Other:  - neg other ROS          Physical Exam    Airway  airway exam normal      Mallampati: III   TM distance: > 3 FB   Neck ROM: full   Mouth opening: > 3 cm    Respiratory Devices and Support         Dental  no notable dental history     (+) Modest Abnormalities - crowns, retainers, 1 or 2 missing teeth      Cardiovascular   cardiovascular exam normal       Rhythm and rate: regular and normal     Pulmonary   pulmonary exam normal        breath sounds clear to auscultation           OUTSIDE LABS:  CBC: No results found for: \"WBC\", \"HGB\", " "\"HCT\", \"PLT\"  BMP: No results found for: \"NA\", \"POTASSIUM\", \"CHLORIDE\", \"CO2\", \"BUN\", \"CR\", \"GLC\"  COAGS: No results found for: \"PTT\", \"INR\", \"FIBR\"  POC: No results found for: \"BGM\", \"HCG\", \"HCGS\"  HEPATIC: No results found for: \"ALBUMIN\", \"PROTTOTAL\", \"ALT\", \"AST\", \"GGT\", \"ALKPHOS\", \"BILITOTAL\", \"BILIDIRECT\", \"GAIL\"  OTHER: No results found for: \"PH\", \"LACT\", \"A1C\", \"ASA\", \"PHOS\", \"MAG\", \"LIPASE\", \"AMYLASE\", \"TSH\", \"T4\", \"T3\", \"CRP\", \"SED\"    Anesthesia Plan    ASA Status:  3    NPO Status:  NPO Appropriate    Anesthesia Type: General.     - Airway: LMA   Induction: Intravenous, Propofol.   Maintenance: Balanced.        Consents    Anesthesia Plan(s) and associated risks, benefits, and realistic alternatives discussed. Questions answered and patient/representative(s) expressed understanding.     - Discussed:     - Discussed with:  Patient            Postoperative Care    Pain management: IV analgesics, Oral pain medications, Multi-modal analgesia, Peripheral nerve block (Single Shot).   PONV prophylaxis: Ondansetron (or other 5HT-3), Dexamethasone or Solumedrol, Droperidol or Haldol     Comments:               hEsan Loaiza MD    I have reviewed the pertinent notes and labs in the chart from the past 30 days and (re)examined the patient.  Any updates or changes from those notes are reflected in this note.              # Severe Obesity: Estimated body mass index is 48.61 kg/m  as calculated from the following:    Height as of this encounter: 1.524 m (5').    Weight as of this encounter: 112.9 kg (248 lb 14.4 oz).      "

## 2024-09-06 NOTE — ANESTHESIA PROCEDURE NOTES
Airway       Patient location during procedure: OR  Staff -        Anesthesiologist:  Ehsan Loaiza MD       CRNA: Kathe Gonzalez APRN CRNA       Performed By: CRNA  Consent for Airway        Urgency: elective  Indications and Patient Condition       Indications for airway management: sage-procedural       Induction type:inhalational       Mask difficulty assessment: 0 - not attempted    Final Airway Details       Final airway type: supraglottic airway    Supraglottic Airway Details        Type: LMA       Brand: Other (comment)       LMA size: 4    Post intubation assessment        Placement verified by: capnometry, equal breath sounds and chest rise        Number of attempts at approach: 1       Number of other approaches attempted: 0       Secured with: commercial tube castillo and tape       Ease of procedure: easy       Dentition: Intact and Unchanged

## 2024-09-06 NOTE — OP NOTE
This is Dr. Serge Vazquez dictating an operative note the patient Lynda Knight..    Date of operation is 9/6/2024    Preoperative diagnosis:    1.  Posttraumatic arthritis left ankle.      Postoperative diagnosis:    Same    Procedure:    1.  Left ankle fusion, open.    2.  Placement of Augment biologic.    Surgeon:    Dr. Seymour Vazquez.    First Assistant:    SULAIMAN Faith.    Patient was brought to the operating suite and placed in a supine position and a general anesthetic was given without difficulty of the left lower extremity was prepped and draped in usual fashion tourniquet left thigh was inflated to 275 mmHg after exsanguination the leg with an Esmarch.    An anterior incision was made with a 10 blade down to and through the interval between the anterior tibial tendon and the extensor hallucis longus tendon and a large mallet.    A 5 mm high-speed bur was then used to decorticate the subchondral bone and the remainder of the cartilage in the back of the ankle joint.  Once the hard subchondral bone was debrided a 3 point 0.0 mm drill bit was used to fenestrate the fusion surfaces. The ankle was then placed in compression with neutral dorsi-plantar flexion there was a slight gap between the 2 fusion surfaces anteriorly s small augment was then placed into the surface in between the joint to fill the gap.  A temporary Steinmann pin was placed from lateral to distal medial through the talus and the position was checked under fluoroscopy and the position of the talus against the distal tibia seen to be in good position so an anterior plate was applied from the Clay set with 3Di screws.  Then a 7.0 max torque screwplaced at the medial metaphyseal flare into the lateral body of the talus and a medial to lateral to give maximal compression..    Position again was checked under fluoroscopy and the position of the talus in relationship to the tibia was found to be in good position.    Wound was then  irrigated closed layered fashion a Quintana splint was applied the patient was awakened brought recovery in stable condition.    Jason Leung, PAC was essential in providing intraoperative positioning patient wound irrigation layered closure and dressing application.    This is Dr. Serge Vazquez dictating an operative note Lynda Quintana.

## 2024-09-06 NOTE — PROVIDER NOTIFICATION
Notified [facility] Dr. Loaiza that pt's oxygen saturations drop when she falls asleep to 83-88% on RA. When pt wakes up they go right back up into the high 90's. RR WDL. Normal depth and pattern. Pt took 5mg of oxycodone at 9:40 for a pain of 5/10. No history of SOPHIE. Pt has been given an IS and instructed on proper use of it. Pt unable to walk around due to surgery. Provider Dr. Loaiza states it would be best for pt to stay overnight for observation due to oxygen saturation dropping while asleep. RN will notify surgical team and patient/family.

## 2024-09-06 NOTE — ANESTHESIA CARE TRANSFER NOTE
Patient: Lynda Knight    Procedure: Procedure(s):  LEFT OPEN ANKLE FUSION       Diagnosis: Arthritis of left ankle [M19.072]  Diagnosis Additional Information: No value filed.    Anesthesia Type:   General     Note:    Oropharynx: oropharynx clear of all foreign objects  Level of Consciousness: awake  Oxygen Supplementation: face mask  Level of Supplemental Oxygen (L/min / FiO2): 8  Independent Airway: airway patency satisfactory and stable  Dentition: dentition unchanged  Vital Signs Stable: post-procedure vital signs reviewed and stable  Report to RN Given: handoff report given  Patient transferred to: PACU    Handoff Report: Identifed the Patient, Identified the Reponsible Provider, Reviewed the pertinent medical history, Discussed the surgical course, Reviewed Intra-OP anesthesia mangement and issues during anesthesia, Set expectations for post-procedure period and Allowed opportunity for questions and acknowledgement of understanding      Vitals:  Vitals Value Taken Time   /70 09/06/24 0840   Temp 36.2  C (97.16  F) 09/06/24 0842   Pulse 90 09/06/24 0842   Resp 18 09/06/24 0842   SpO2 99 % 09/06/24 0842   Vitals shown include unfiled device data.    Electronically Signed By: ADORE SALOMON CRNA  September 6, 2024  8:44 AM

## 2024-09-07 ENCOUNTER — APPOINTMENT (OUTPATIENT)
Dept: PHYSICAL THERAPY | Facility: CLINIC | Age: 74
End: 2024-09-07
Attending: PHYSICIAN ASSISTANT
Payer: MEDICARE

## 2024-09-07 VITALS
RESPIRATION RATE: 18 BRPM | BODY MASS INDEX: 48.87 KG/M2 | HEIGHT: 60 IN | OXYGEN SATURATION: 95 % | DIASTOLIC BLOOD PRESSURE: 49 MMHG | TEMPERATURE: 98 F | HEART RATE: 75 BPM | SYSTOLIC BLOOD PRESSURE: 108 MMHG | WEIGHT: 248.9 LBS

## 2024-09-07 LAB — GLUCOSE BLDC GLUCOMTR-MCNC: 98 MG/DL (ref 70–99)

## 2024-09-07 PROCEDURE — 97530 THERAPEUTIC ACTIVITIES: CPT | Mod: GP

## 2024-09-07 PROCEDURE — 97162 PT EVAL MOD COMPLEX 30 MIN: CPT | Mod: GP

## 2024-09-07 PROCEDURE — G0378 HOSPITAL OBSERVATION PER HR: HCPCS

## 2024-09-07 PROCEDURE — 250N000011 HC RX IP 250 OP 636: Mod: JZ | Performed by: PHYSICIAN ASSISTANT

## 2024-09-07 PROCEDURE — 82962 GLUCOSE BLOOD TEST: CPT

## 2024-09-07 PROCEDURE — 97116 GAIT TRAINING THERAPY: CPT | Mod: GP

## 2024-09-07 PROCEDURE — 250N000013 HC RX MED GY IP 250 OP 250 PS 637: Performed by: PHYSICIAN ASSISTANT

## 2024-09-07 PROCEDURE — 250N000013 HC RX MED GY IP 250 OP 250 PS 637: Performed by: ORTHOPAEDIC SURGERY

## 2024-09-07 RX ADMIN — OXYCODONE HYDROCHLORIDE 10 MG: 5 TABLET ORAL at 06:21

## 2024-09-07 RX ADMIN — SENNOSIDES AND DOCUSATE SODIUM 1 TABLET: 50; 8.6 TABLET ORAL at 08:37

## 2024-09-07 RX ADMIN — POLYETHYLENE GLYCOL 3350 17 G: 17 POWDER, FOR SOLUTION ORAL at 08:37

## 2024-09-07 RX ADMIN — CELECOXIB 100 MG: 100 CAPSULE ORAL at 08:37

## 2024-09-07 RX ADMIN — OXYCODONE HYDROCHLORIDE 10 MG: 5 TABLET ORAL at 11:46

## 2024-09-07 RX ADMIN — ASPIRIN 325 MG: 325 TABLET ORAL at 08:35

## 2024-09-07 RX ADMIN — CEFAZOLIN SODIUM 2 G: 2 INJECTION, SOLUTION INTRAVENOUS at 00:38

## 2024-09-07 RX ADMIN — ACETAMINOPHEN 650 MG: 325 TABLET ORAL at 11:46

## 2024-09-07 RX ADMIN — EZETIMIBE 10 MG: 10 TABLET ORAL at 08:36

## 2024-09-07 RX ADMIN — LEVOTHYROXINE SODIUM 150 MCG: 75 TABLET ORAL at 06:18

## 2024-09-07 RX ADMIN — ACETAMINOPHEN 650 MG: 325 TABLET ORAL at 06:17

## 2024-09-07 RX ADMIN — HYDROXYZINE HYDROCHLORIDE 10 MG: 10 TABLET, FILM COATED ORAL at 01:24

## 2024-09-07 ASSESSMENT — ACTIVITIES OF DAILY LIVING (ADL)
ADLS_ACUITY_SCORE: 35
ADLS_ACUITY_SCORE: 31
ADLS_ACUITY_SCORE: 35
ADLS_ACUITY_SCORE: 31
ADLS_ACUITY_SCORE: 35
ADLS_ACUITY_SCORE: 31
ADLS_ACUITY_SCORE: 35
ADLS_ACUITY_SCORE: 31
ADLS_ACUITY_SCORE: 31

## 2024-09-07 NOTE — PROGRESS NOTES
[facility]  Orthopedics Progress Note       S:  POD # 1 s/p LEFT OPEN ANKLE FUSION  Patient reports she is doing well at this time.  She has some pain in her distal tibia, but is not alarmed by this.  She feels her pain is well controlled with oral medication.  She is interested in going home today.      O:  Blood pressure 108/49, pulse 75, temperature 98  F (36.7  C), temperature source Oral, resp. rate 18, height 1.524 m (5'), weight 112.9 kg (248 lb 14.4 oz), SpO2 95%.      Patient is working with physical therapy when I arrive.  She is unsteady on the knee scooter.  Her splint is clean, dry, and intact.  She is able to lift the leg, if she assists, bend the knee and wiggle the toes without difficulty.  Neurovascular exam is intact.  Her contralateral leg is soft and nontender to palpation.      A/P:  POD # 1 s/p LEFT OPEN ANKLE FUSION  Elevate leg at heart level for edema control  No weightbearing, but may touchdown for balance only  Oral medication as needed for pain  Aspirin for DVT prophylaxis   PT/OT for gait, transfers and ADL's  Patient may discharge home today      Jason Leung PA-C

## 2024-09-07 NOTE — DISCHARGE SUMMARY
DATE OF ADMISSION:  9/6/24    DATE OF DISCHARGE:  9/7/2024    ADMISSION DIAGNOSIS: ankle DJD, left    PROCEDURE PERFORMED DURING THIS ADMISSION:  LEFT OPEN ANKLE FUSION    INDICATION FOR HOSPITAL ADMISSION:  Patient was admitted to the floor for observation following a left ankle fusion.  She had difficulty with hypoxia following surgery and it was suggested she stay for observation.     HOSPITAL COURSE:  Patient underwent successful ankle fusion on 9/6/24.  She had hypoxia following surgery and anesthesia recommended overnight stay for observation.  She did well overnight.  She felt her pain was well controlled with oral medication.  She met with PT/OT and felt comfortable going home.  She was allowed to discharge home on POD #1.    Jason Leung PA-C

## 2024-09-07 NOTE — PROGRESS NOTES
09/07/24 1014   Appointment Info   Signing Clinician's Name / Credentials (PT) Keyona Henriquez, PT   Quick Adds   Quick Adds Certification   Living Environment   People in Home alone   Current Living Arrangements   (condo)   Home Accessibility no concerns   Transportation Anticipated family or friend will provide   Self-Care   Regular Exercise No   Equipment Currently Used at Home   (has w/c, fww, 4ww, sec (using SEC long-term))   Fall history within last six months no   Activity/Exercise/Self-Care Comment general indep. except heavier housekeeping tasks   General Information   Onset of Illness/Injury or Date of Surgery 09/06/24   Referring Physician fabby allison   Patient/Family Therapy Goals Statement (PT) get on my feet again   Pertinent History of Current Problem (include personal factors and/or comorbidities that impact the POC) hx R LE shortening surgery, L TKA,  L ECTOR   Existing Precautions/Restrictions weight bearing   Weight-Bearing Status - LLE toe touch weight-bearing  (for balance)   Cognition   Orientation Status (Cognition) oriented x 4   Pain Assessment   Patient Currently in Pain Yes, see Vital Sign flowsheet   Range of Motion (ROM)   ROM Comment R LE limited hip flex approx 80, ER 20, IR 10. L knee flexion 90; full extension. L ankle casted. L hip limited testing/hx of repeated dislocations   Strength (Manual Muscle Testing)   Strength Comments R LE WFL   Bed Mobility   Comment, (Bed Mobility) NA; ;sleeps in a recliner   Transfers   Maintains Weight-bearing Status (Transfers) able to maintain   Transfer Safety Concerns Noted losing balance backward  (not actually losing, but some sway noted)   Impairments Contributing to Impaired Transfers impaired balance;decreased strength   Gait/Stairs (Locomotion)   Assistive Device (Gait) walker, front-wheeled   Distance in Feet (Gait) 3  (then cues/treatment)   Deviations/Abnormal Patterns (Gait) gait speed decreased  (small hops/limited clearance)    Maintains Weight-bearing Status (Gait) able to maintain   Comment, (Gait/Stairs) see treatment section   Balance   Balance Comments some retro sway while shifting hands to walker from sitting surface   Sensory Examination   Sensory Perception patient reports no sensory changes   Clinical Impression   Criteria for Skilled Therapeutic Intervention Yes, treatment indicated   PT Diagnosis (PT) impaired functional mobility   Influenced by the following impairments TTWB L LE   Functional limitations due to impairments transfer, amb.   Clinical Presentation (PT Evaluation Complexity) stable   Clinical Presentation Rationale presents as medically diagnosed   Clinical Decision Making (Complexity) low complexity   Planned Therapy Interventions (PT) gait training;transfer training   Risk & Benefits of therapy have been explained care plan/treatment goals reviewed;evaluation/treatment results reviewed;patient;friend   PT Total Evaluation Time   PT Eval, Moderate Complexity Minutes (45516) 20   Therapy Certification   Start of care date 09/07/24   Certification date from 09/07/24   Certification date to 10/07/24   Medical Diagnosis ankle fusion   Physical Therapy Goals   PT Frequency One time eval and treatment only   PT Predicted Duration/Target Date for Goal Attainment 09/07/24   PT Goals Transfers;Gait   PT: Transfers Minimal assist;Assistive device;Goal Met;Completed   PT: Gait Minimal assist;Rolling walker;10 feet;Goal Met;Completed   Interventions   Interventions Quick Adds Gait Training;Therapeutic Activity   Therapeutic Activity   Therapeutic Activities: dynamic activities to improve functional performance Minutes (98731) 15   Treatment Detail/Skilled Intervention many trials of sit<>stand from EOB and from recliner; generally cga-min A; transfer on/off knee walker, slow/cues/cga. Pivots with walker cga (once standing); actually does NWB even though allowed TTWB for balance   Gait Training   Gait Training Minutes  (67007) 12   Symptoms Noted During/After Treatment (Gait Training) shortness of breath   Treatment Detail/Skilled Intervention cues for safety   Distance in Feet 12 16, 5  (12 and 16 -with fww and 5 with knee walker)   [place] Level (Gait Training) contact guard  (both devices)   Physical Assistance Level (Gait Training) verbal cues;1 person assist   Weight Bearing (Gait Training) toe touch weight-bearing  (allowed for balance, but did NWB)   Assistive Device (Gait Training) rolling walker  (knee walker)   Gait Analysis Deviations decreased step length   Impairments (Gait Analysis/Training) strength decreased   PT Discharge Planning   PT Plan d/c   PT Discharge Recommendation (DC Rec) home with assist  (going to friend's house)   PT Rationale for DC Rec friend available to help and here observing PT session   PT Brief overview of current status amb short distance with fww/cga   PT Equipment Needed at Discharge   (has fww; decided not comfortable with knee walker; has w/c)   Total Session Time   Timed Code Treatment Minutes 27   Total Session Time (sum of timed and untimed services) 47   [facility]  OUTPATIENT PHYSICAL THERAPY EVALUATION  PLAN OF TREATMENT FOR OUTPATIENT REHABILITATION  (COMPLETE FOR INITIAL CLAIMS ONLY)  Patient's Last Name, First Name, M.I.  YOB: 1950  Lynda Knight                        Provider's Name  [facility] Medical Record No.  [id number]                             Onset Date:  09/06/24   Start of Care Date:  09/07/24   Type:     _X_PT   ___OT   ___SLP Medical Diagnosis:  ankle fusion              PT Diagnosis:  impaired functional mobility Visits from SOC:  1     See note for plan of treatment, functional goals and certification details    I CERTIFY THE NEED FOR THESE SERVICES FURNISHED UNDER        THIS PLAN OF TREATMENT AND WHILE UNDER MY CARE     (Physician co-signature of this document indicates  review and certification of the therapy plan).

## 2024-09-07 NOTE — PROVIDER NOTIFICATION
Nurse reviewed discharge instructions with patient and her friend, with whom she will be stating for a couple of weeks. They both asked excellent questions. Patient demonstrated her use of the Incentive Spirometer, and will continue to use it at home. She has a Wellness Visit in a few weeks with the same provider who saw her preoperatively, but she was advised that if she has any SOB, difficulties breathing particularly at night, she should see her PMD right away. She stated she is also followed by her cardiologist, and will be seeing them in October. Discussed importance of following up on her undiagnosed Ostructive Sleep Apnea, found by Anesthesiologist.  She stated she had all of her belongings, and she was escorted by wheelchair to her friend's car.

## 2024-09-07 NOTE — PLAN OF CARE
Physical Therapy Discharge Summary    Reason for therapy discharge:    Discharged to home.    Progress towards therapy goal(s). See goals on Care Plan in [facility] electronic health record for goal details.  Goals met    Therapy recommendation(s):    Continue home exercise program.

## 2024-09-07 NOTE — PROGRESS NOTES
Patient vital signs are at baseline: Yes  Patient able to ambulate as they were prior to admission or with assist devices provided by therapies during their stay:  pivot to commode - toe touch weight bearing for balance  Patient MUST void prior to discharge:  Yes  Patient able to tolerate oral intake:  Yes  Pain has adequate pain control using Oral analgesics:  Yes  Does patient have an identified :  Yes  Has goal D/C date and time been discussed with patient:  Yes    PRN oxycodone 5 mg somewhat effective. CMS intact. Possible discharge today after therapy.

## (undated) DEVICE — GLOVE BIOGEL PI ULTRATOUCH G SZ 7.0 42170

## (undated) DEVICE — SU ETHILON 3-0 PS-1 18" 1663G

## (undated) DEVICE — DRAPE MINI C-ARM INSIGHT2 CF-5423

## (undated) DEVICE — SOL WATER IRRIG 1000ML BOTTLE 2F7114

## (undated) DEVICE — TOURNIQUET SGL BLADDER 34"X4" PURPLE 5921-034-135

## (undated) DEVICE — GLOVE BIOGEL PI INDICATOR 8.0 LF 41680

## (undated) DEVICE — SUCTION MANIFOLD NEPTUNE 2 SYS 1 PORT 702-025-000

## (undated) DEVICE — DRSG ABD TNDRSRB WET PRUF 8IN X 10IN STRL  9194A

## (undated) DEVICE — Device

## (undated) DEVICE — SUCTION CANISTER MEDIVAC LINER 3000ML W/LID 65651-530

## (undated) DEVICE — BUR STRK ROUND 4.8X51.2MM FAST CUT 8 FLUTE 1608-006-139

## (undated) DEVICE — GOWN XXL 9575

## (undated) DEVICE — ELECTRODE PATIENT RETURN ADULT L10 FT 2 PLATE CORD 0855C

## (undated) DEVICE — SU VICRYL+ 3-0 CT1 36IN UND VCP944H

## (undated) DEVICE — SUTURE VICRYL+ 2-0 CT-2 27" UND VCP269H

## (undated) DEVICE — DRESSING XEROFORM PETROLATUM 5X9 33605

## (undated) DEVICE — BIT DRL 4.7MM .25IN CNN SQ CNCT

## (undated) DEVICE — DRILL BIT 2.8X60MM

## (undated) DEVICE — CUSTOM PACK TOTAL KNEE SOP5BTKHEC

## (undated) DEVICE — SU VICRYL+ 0 27IN CT-2 UND VCP270H

## (undated) DEVICE — GLOVE BIOGEL INDICATOR 7.5 LF 41675

## (undated) DEVICE — A3 SUPPLIES- SEE NURSING INFO PAGE

## (undated) DEVICE — CAST PADDING 4" STERILE 9044S

## (undated) DEVICE — SOL NACL 0.9% IRRIG 1000ML BOTTLE 2F7124

## (undated) DEVICE — DRSG GAUZE 4X4" TRAY 6939

## (undated) DEVICE — GLOVE BIOGEL PI ULTRATOUCH G SZ 8.5 42185

## (undated) DEVICE — CAST PLASTER SPLINT XTRA FAST 5X30" 7392

## (undated) DEVICE — SOLIDIFIER FLD 3.2OZ  CL-FREE F/ BIOHZRD WASTE 3000ML CANIST

## (undated) RX ORDER — PROPOFOL 10 MG/ML
INJECTION, EMULSION INTRAVENOUS
Status: DISPENSED
Start: 2024-09-06

## (undated) RX ORDER — ONDANSETRON 2 MG/ML
INJECTION INTRAMUSCULAR; INTRAVENOUS
Status: DISPENSED
Start: 2024-09-06

## (undated) RX ORDER — EPHEDRINE SULFATE 50 MG/ML
INJECTION, SOLUTION INTRAMUSCULAR; INTRAVENOUS; SUBCUTANEOUS
Status: DISPENSED
Start: 2024-09-06

## (undated) RX ORDER — LIDOCAINE HYDROCHLORIDE 10 MG/ML
INJECTION, SOLUTION EPIDURAL; INFILTRATION; INTRACAUDAL; PERINEURAL
Status: DISPENSED
Start: 2024-09-06

## (undated) RX ORDER — DEXAMETHASONE SODIUM PHOSPHATE 10 MG/ML
INJECTION, EMULSION INTRAMUSCULAR; INTRAVENOUS
Status: DISPENSED
Start: 2024-09-06

## (undated) RX ORDER — FENTANYL CITRATE 50 UG/ML
INJECTION, SOLUTION INTRAMUSCULAR; INTRAVENOUS
Status: DISPENSED
Start: 2024-09-06